# Patient Record
Sex: MALE
[De-identification: names, ages, dates, MRNs, and addresses within clinical notes are randomized per-mention and may not be internally consistent; named-entity substitution may affect disease eponyms.]

---

## 2021-06-04 PROBLEM — Z00.00 ENCOUNTER FOR PREVENTIVE HEALTH EXAMINATION: Status: ACTIVE | Noted: 2021-06-04

## 2021-06-10 ENCOUNTER — APPOINTMENT (OUTPATIENT)
Dept: RADIATION ONCOLOGY | Facility: CLINIC | Age: 71
End: 2021-06-10
Payer: COMMERCIAL

## 2021-06-10 DIAGNOSIS — F32.9 ANXIETY DISORDER, UNSPECIFIED: ICD-10-CM

## 2021-06-10 DIAGNOSIS — Z78.9 OTHER SPECIFIED HEALTH STATUS: ICD-10-CM

## 2021-06-10 DIAGNOSIS — Z87.891 PERSONAL HISTORY OF NICOTINE DEPENDENCE: ICD-10-CM

## 2021-06-10 DIAGNOSIS — F41.9 ANXIETY DISORDER, UNSPECIFIED: ICD-10-CM

## 2021-06-10 DIAGNOSIS — E78.5 HYPERLIPIDEMIA, UNSPECIFIED: ICD-10-CM

## 2021-06-10 DIAGNOSIS — Z92.3 PERSONAL HISTORY OF IRRADIATION: ICD-10-CM

## 2021-06-10 DIAGNOSIS — K21.9 GASTRO-ESOPHAGEAL REFLUX DISEASE W/OUT ESOPHAGITIS: ICD-10-CM

## 2021-06-10 DIAGNOSIS — Z80.9 FAMILY HISTORY OF MALIGNANT NEOPLASM, UNSPECIFIED: ICD-10-CM

## 2021-06-10 DIAGNOSIS — C61 MALIGNANT NEOPLASM OF PROSTATE: ICD-10-CM

## 2021-06-10 DIAGNOSIS — Z87.39 PERSONAL HISTORY OF OTHER DISEASES OF THE MUSCULOSKELETAL SYSTEM AND CONNECTIVE TISSUE: ICD-10-CM

## 2021-06-10 PROCEDURE — 99213 OFFICE O/P EST LOW 20 MIN: CPT | Mod: 95

## 2021-06-10 RX ORDER — TAMSULOSIN HYDROCHLORIDE 0.4 MG/1
CAPSULE ORAL
Refills: 0 | Status: ACTIVE | COMMUNITY

## 2021-06-10 RX ORDER — METHYLPHENIDATE HYDROCHLORIDE 5 MG/1
TABLET ORAL
Refills: 0 | Status: ACTIVE | COMMUNITY

## 2021-06-10 RX ORDER — BACILLUS COAGULANS/INULIN 1B-250 MG
CAPSULE ORAL
Refills: 0 | Status: ACTIVE | COMMUNITY

## 2021-06-10 RX ORDER — DOXEPIN 6 MG/1
TABLET, FILM COATED ORAL
Refills: 0 | Status: ACTIVE | COMMUNITY

## 2021-06-10 RX ORDER — BUSPIRONE HYDROCHLORIDE 7.5 MG/1
TABLET ORAL
Refills: 0 | Status: ACTIVE | COMMUNITY

## 2021-06-10 NOTE — DISEASE MANAGEMENT
[2] : T2 [c] : c [7(3+4)] : Harpal Score 7(3+4) [BiopsyDate] : 1/14/2020 [TotalCores] : 12 [TotalPositiveCores] : 5 [III] : III

## 2021-06-10 NOTE — HISTORY OF PRESENT ILLNESS
[Home] : at home, [unfilled] , at the time of the visit. [Medical Office: ([facility])___] : at the medical office located in  [Verbal consent obtained from patient] : the patient, [unfilled] [FreeTextEntry1] : Mr. 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 On 9/25/2020, he completed a course of definitive stereotactic body radiation therapy with Cyberknife technology to the prostate for a total of 3625 cGy over 5 fractions.  He was treated with combination therapy to include a course of neoadjuvant, concurrent, and consolidative ADT under the care of Dr. Fletcher.  \par \par 6/10/21- FOLLOW UP\par Mr. Castellon follows up today via phone. Today, he notes he feels generally well, with no appreciable symptomatology related to his definitive radiation therapy.  Specifically, he notes baseline urine function with nocturia x0-1, while still using flomax 0.4mg at night.  He denies dysuria or incontinence.  He denies blood in the urine.  He has no blood or mucous in the stool, and denies rectal pain.  He was receiving ADT but, due to fatigue and injection intolerance, he had refused further ADT depots in Feb-March of this year.  His PSA has been rising, subsequently, as follows:  \par \par His PSA was 1.7 ng/mL on 1/20/21.\par His PSA was 2.69 ng/mL in February 2021.\par His PSA was 5.1 ng/mL and Testosterone was 42 ng/dL on 4/23/21.\par His PSA was 8.2 ng/mL and Testosterone was 85 ng/dL on 5/28/21. \par \par He notes mild discomfort in the coccyx for which he has seen a chiropractor.  Otherwise, he denies weight changes or any other bone pain.\par  \par

## 2021-07-22 ENCOUNTER — APPOINTMENT (OUTPATIENT)
Dept: RADIATION ONCOLOGY | Facility: CLINIC | Age: 71
End: 2021-07-22
Payer: COMMERCIAL

## 2021-07-22 PROCEDURE — 99215 OFFICE O/P EST HI 40 MIN: CPT | Mod: 95

## 2021-07-25 NOTE — HISTORY OF PRESENT ILLNESS
[FreeTextEntry1] : Mr. 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 On 9/25/2020, he completed a course of definitive stereotactic body radiation therapy with Cyberknife technology to the prostate for a total of 3625 cGy over 5 fractions.  He was treated with combination therapy to include a course of neoadjuvant, concurrent, and consolidative ADT under the care of Dr. Fletcher.  \par \par 7/22/21- Follow up-  the patient notes that he feels well, with nocturia x0 with the use of flomax 0.4mg at night, with no urinary or rectal symptomatology.  Given the rise in his PSA, he underwent a CT a/p on 6/16/21 which was without evidence of adenopathy, although a bone scan the following day revealed uptake in the cervical spine and left sacrum.  An MRI of his cervical spine on 6/25/21 revealed metastatic disease to C4 and C5 extending into the anterior osteophytes and ossification of the anterior longitudinal ligament.  There was no epidural extension of disease.  An MRI of his sacrum the same day revealed an approximately 4 x 2.1 x 5.7cm focus of marrow replacement on the left side of the iliac bone and corresponding tot eh focus of radiotracer activity on his bone imaging, consistent as well with metastatic disease.  The patient notes neck stiffness and pain the sacral region, both of which is relieved with NSAIDs.  He denies any weakness in the extremities or change in sensation, including incontinence or changes in bowel habits, or saddle paresthesias.  He was placed back on Firmagon roughly a week ago and is scheduled to follow again with Dr. Fletcher.\par \par 6/10/21- FOLLOW UP\par Mr. Castellon follows up today via phone. Today, he notes he feels generally well, with no appreciable symptomatology related to his definitive radiation therapy.  Specifically, he notes baseline urine function with nocturia x0-1, while still using flomax 0.4mg at night.  He denies dysuria or incontinence.  He denies blood in the urine.  He has no blood or mucous in the stool, and denies rectal pain.  He was receiving ADT but, due to fatigue and injection intolerance, he had refused further ADT depots in Feb-March of this year.  His PSA has been rising, subsequently, as follows:  \par \par His PSA was 1.7 ng/mL on 1/20/21.\par His PSA was 2.69 ng/mL in February 2021.\par His PSA was 5.1 ng/mL and Testosterone was 42 ng/dL on 4/23/21.\par His PSA was 8.2 ng/mL and Testosterone was 85 ng/dL on 5/28/21. \par \par He develped coccyx pain that prompted imaging.\par \par Bone scan 6/17/21: uptake in cervical spine and left sacrum \par \par MRI 6/25/21: metastatic disease evidenced at c4-5 vert bodies extending into anterior longitudinal ligament and medial left iliac bone disease spanning 5.7cm\par

## 2021-07-25 NOTE — DISEASE MANAGEMENT
[2] : T2 [c] : c [7(3+4)] : Harpal Score 7(3+4) [III] : III [BiopsyDate] : 1/14/2020 [TotalCores] : 12 [TotalPositiveCores] : 5

## 2021-09-01 ENCOUNTER — NON-APPOINTMENT (OUTPATIENT)
Age: 71
End: 2021-09-01

## 2021-09-01 VITALS
RESPIRATION RATE: 16 BRPM | TEMPERATURE: 98 F | SYSTOLIC BLOOD PRESSURE: 159 MMHG | OXYGEN SATURATION: 98 % | HEART RATE: 82 BPM | DIASTOLIC BLOOD PRESSURE: 97 MMHG

## 2021-09-10 ENCOUNTER — NON-APPOINTMENT (OUTPATIENT)
Age: 71
End: 2021-09-10

## 2021-09-10 VITALS
HEART RATE: 76 BPM | SYSTOLIC BLOOD PRESSURE: 147 MMHG | DIASTOLIC BLOOD PRESSURE: 92 MMHG | OXYGEN SATURATION: 96 % | WEIGHT: 225 LBS | BODY MASS INDEX: 34.1 KG/M2 | TEMPERATURE: 98 F | HEIGHT: 68 IN

## 2021-09-13 NOTE — HISTORY OF PRESENT ILLNESS
[FreeTextEntry1] : 09/10/21- OTV- Today he has completed 2700 cGy /3000 cGy and notes he is feeling well. Pt c/o no appreciable symptomatology related to his definitive radiation therapy.  Specifically, he notes his neck pain remains subsided and mobility of neck is much improved. He also states his hip discomfort has improved, stated that his Left Hip used to have a tightness pain when walking that has also subsided. He has baseline urine function with nocturia x 0, while using Flomax 0.4mg at night.  He denies dysuria, leakage or incontinence.  He denies blood in the urine.  He has no blood or mucous in the stool and denies rectal pain. He reports his constipation has subsided and continues with addition Dulcolax. Will continue with RT. \par \par 09/01/21- OTV- Today he has completed 1200 cGy /3000 cGy and notes he is feeling well. Pt c/o no appreciable symptomatology related to his definitive radiation therapy.  Specifically, he notes his neck pain has subsided and that he can turn his neck without pain for the first time in 2 months. He also states his hip discomfort has improved, stated that his Left Hip used to have a tightness pain when walking that has also subsided. He has baseline urine function with nocturia x 0, while using Flomax 0.4mg at night.  He denies dysuria, leakage or incontinence.  He denies blood in the urine.  He has no blood or mucous in the stool and denies rectal pain. He c/o constipation and recommended to increase Colace to TID and add senna, encourage prunes/ prune juice. Will continue with RT. \par \par Mr. 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 On 9/25/2020, he completed a course of definitive stereotactic body radiation therapy with Cyberknife technology to the prostate for a total of 3625 cGy over 5 fractions.  He was treated with combination therapy to include a course of neoadjuvant, concurrent, and consolidative ADT under the care of Dr. Fletcher.  \par \par 7/22/21- Follow up-  the patient notes that he feels well, with nocturia x0 with the use of flomax 0.4mg at night, with no urinary or rectal symptomatology.  Given the rise in his PSA, he underwent a CT a/p on 6/16/21 which was without evidence of adenopathy, although a bone scan the following day revealed uptake in the cervical spine and left sacrum.  An MRI of his cervical spine on 6/25/21 revealed metastatic disease to C4 and C5 extending into the anterior osteophytes and ossification of the anterior longitudinal ligament.  There was no epidural extension of disease.  An MRI of his sacrum the same day revealed an approximately 4 x 2.1 x 5.7cm focus of marrow replacement on the left side of the iliac bone and corresponding tot eh focus of radiotracer activity on his bone imaging, consistent as well with metastatic disease.  The patient notes neck stiffness and pain the sacral region, both of which is relieved with NSAIDs.  He denies any weakness in the extremities or change in sensation, including incontinence or changes in bowel habits, or saddle paresthesias.  He was placed back on Firmagon roughly a week ago and is scheduled to follow again with Dr. Fletcher.\par \par 6/10/21- FOLLOW UP\par Mr. Castellon follows up today via phone. Today, he notes he feels generally well, with no appreciable symptomatology related to his definitive radiation therapy.  Specifically, he notes baseline urine function with nocturia x0-1, while still using flomax 0.4mg at night.  He denies dysuria or incontinence.  He denies blood in the urine.  He has no blood or mucous in the stool, and denies rectal pain.  He was receiving ADT but, due to fatigue and injection intolerance, he had refused further ADT depots in Feb-March of this year.  His PSA has been rising, subsequently, as follows:  \par \par His PSA was 1.7 ng/mL on 1/20/21.\par His PSA was 2.69 ng/mL in February 2021.\par His PSA was 5.1 ng/mL and Testosterone was 42 ng/dL on 4/23/21.\par His PSA was 8.2 ng/mL and Testosterone was 85 ng/dL on 5/28/21. \par \par He develped coccyx pain that prompted imaging.\par \par Bone scan 6/17/21: uptake in cervical spine and left sacrum \par \par MRI 6/25/21: metastatic disease evidenced at c4-5 vert bodies extending into anterior longitudinal ligament and medial left iliac bone disease spanning 5.7cm\par

## 2021-09-13 NOTE — DISEASE MANAGEMENT
[Clinical] : TNM Stage: c [TTNM] : x [NTNM] : x [MTNM] : 1 [de-identified] : 9679 [de-identified] : 7314 [de-identified] : cervical spine and Left iliac [BiopsyDate] : 1/14/2020 [TotalCores] : 12 [TotalPositiveCores] : 5

## 2021-09-13 NOTE — HISTORY OF PRESENT ILLNESS
[FreeTextEntry1] : 09/01/21- OTV- Today he has completed 1200 cGy /3000 cGy and notes he is feeling well. Pt c/o no appreciable symptomatology related to his definitive radiation therapy.  Specifically, he notes his neck pain has subsided and that he can turn his neck without pain for the first time in 2 months. He also states his hip discomfort has improved, stated that his Left Hip used to have a tightness pain when walking that has also subsided. He has baseline urine function with nocturia x 0, while using Flomax 0.4mg at night.  He denies dysuria, leakage or incontinence.  He denies blood in the urine.  He has no blood or mucous in the stool and denies rectal pain. He c/o constipation and recommended to increase Colace to TID and add senna, encourage prunes/ prune juice. Will continue with RT. \par \par Mr. 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 On 9/25/2020, he completed a course of definitive stereotactic body radiation therapy with Cyberknife technology to the prostate for a total of 3625 cGy over 5 fractions.  He was treated with combination therapy to include a course of neoadjuvant, concurrent, and consolidative ADT under the care of Dr. Fletcher.  \par \par 7/22/21- Follow up-  the patient notes that he feels well, with nocturia x0 with the use of flomax 0.4mg at night, with no urinary or rectal symptomatology.  Given the rise in his PSA, he underwent a CT a/p on 6/16/21 which was without evidence of adenopathy, although a bone scan the following day revealed uptake in the cervical spine and left sacrum.  An MRI of his cervical spine on 6/25/21 revealed metastatic disease to C4 and C5 extending into the anterior osteophytes and ossification of the anterior longitudinal ligament.  There was no epidural extension of disease.  An MRI of his sacrum the same day revealed an approximately 4 x 2.1 x 5.7cm focus of marrow replacement on the left side of the iliac bone and corresponding tot eh focus of radiotracer activity on his bone imaging, consistent as well with metastatic disease.  The patient notes neck stiffness and pain the sacral region, both of which is relieved with NSAIDs.  He denies any weakness in the extremities or change in sensation, including incontinence or changes in bowel habits, or saddle paresthesias.  He was placed back on Firmagon roughly a week ago and is scheduled to follow again with Dr. Fletcher.\par \par 6/10/21- FOLLOW UP\par Mr. Castellon follows up today via phone. Today, he notes he feels generally well, with no appreciable symptomatology related to his definitive radiation therapy.  Specifically, he notes baseline urine function with nocturia x0-1, while still using flomax 0.4mg at night.  He denies dysuria or incontinence.  He denies blood in the urine.  He has no blood or mucous in the stool, and denies rectal pain.  He was receiving ADT but, due to fatigue and injection intolerance, he had refused further ADT depots in Feb-March of this year.  His PSA has been rising, subsequently, as follows:  \par \par His PSA was 1.7 ng/mL on 1/20/21.\par His PSA was 2.69 ng/mL in February 2021.\par His PSA was 5.1 ng/mL and Testosterone was 42 ng/dL on 4/23/21.\par His PSA was 8.2 ng/mL and Testosterone was 85 ng/dL on 5/28/21. \par \par He develped coccyx pain that prompted imaging.\par \par Bone scan 6/17/21: uptake in cervical spine and left sacrum \par \par MRI 6/25/21: metastatic disease evidenced at c4-5 vert bodies extending into anterior longitudinal ligament and medial left iliac bone disease spanning 5.7cm\par

## 2021-09-13 NOTE — DISEASE MANAGEMENT
[III] : III [2] : T2 [c] : c [7(3+4)] : Harpal Score 7(3+4) [Clinical] : TNM Stage: c [TTNM] : x [NTNM] : x [MTNM] : 1 [IV] : IV [de-identified] : 4532 [de-identified] : 8810 [de-identified] : cervical spine and Left iliac [BiopsyDate] : 1/14/2020 [TotalCores] : 12 [TotalPositiveCores] : 5

## 2021-09-20 ENCOUNTER — NON-APPOINTMENT (OUTPATIENT)
Age: 71
End: 2021-09-20

## 2021-09-20 RX ORDER — LIDOCAINE HYDROCHLORIDE 20 MG/ML
2 SOLUTION ORAL; TOPICAL
Qty: 500 | Refills: 3 | Status: ACTIVE | COMMUNITY
Start: 2021-09-20 | End: 1900-01-01

## 2023-10-01 PROBLEM — Z92.3 HISTORY OF RADIATION THERAPY: Status: RESOLVED | Noted: 2021-06-10 | Resolved: 2023-10-01
